# Patient Record
Sex: FEMALE | Race: BLACK OR AFRICAN AMERICAN | NOT HISPANIC OR LATINO | URBAN - METROPOLITAN AREA
[De-identification: names, ages, dates, MRNs, and addresses within clinical notes are randomized per-mention and may not be internally consistent; named-entity substitution may affect disease eponyms.]

---

## 2020-06-01 ENCOUNTER — OFFICE VISIT (OUTPATIENT)
Dept: URBAN - METROPOLITAN AREA CLINIC 98 | Facility: CLINIC | Age: 55
End: 2020-06-01
Payer: MEDICARE

## 2020-06-01 DIAGNOSIS — Z93.2 ILEOSTOMY PRESENT: ICD-10-CM

## 2020-06-01 DIAGNOSIS — K50.80 CROHN'S DISEASE OF BOTH SMALL AND LARGE INTESTINE WITHOUT COMPLICATION: ICD-10-CM

## 2020-06-01 PROCEDURE — G9711 PT HX TOT COL OR COLON CA: HCPCS | Performed by: INTERNAL MEDICINE

## 2020-06-01 PROCEDURE — 99214 OFFICE O/P EST MOD 30 MIN: CPT | Performed by: INTERNAL MEDICINE

## 2020-06-01 PROCEDURE — 99215 OFFICE O/P EST HI 40 MIN: CPT | Performed by: INTERNAL MEDICINE

## 2020-06-01 RX ORDER — FOLIC ACID 1 MG/1
TAKE 5 TABLETS BY ORAL ROUTE FOR 30 DAYS TABLET ORAL 1
Qty: 0 | Refills: 11 | Status: DISCONTINUED | COMMUNITY
Start: 1900-01-01

## 2020-06-01 RX ORDER — METHOTREXATE SODIUM 25 MG/ML
INJECT 1 MILLILITER (25 MG) BY SUBCUTANEOUS ROUTE ONCE WEEKLY INJECTION, SOLUTION INTRA-ARTERIAL; INTRAMUSCULAR; INTRAVENOUS
Qty: 1 | Refills: 0 | Status: DISCONTINUED | COMMUNITY
Start: 1900-01-01

## 2020-06-01 NOTE — PHYSICAL EXAM NECK/THYROID:
neck supple, full range of motion, thyroid normal , neck supple, full range of motion, thyroid normal

## 2020-06-01 NOTE — PHYSICAL EXAM CARDIOVASCULAR:
regular rate and rhythm, S1, S2 normal, no S3, S4, no murmurs , regular rate and rhythm, S1, S2 normal, no S3, S4, no murmurs

## 2020-06-01 NOTE — HPI-OTHER HISTORIES
55 yo female presents for consideration or J pouch or other appropriate surgery.  Has Crohns and last surgery was 2015.  1) Ahsan Cooney at [place] 2001- attempt at repair of Rectovag fistula- unsuccessful 2) Jin Navarrete - [place] - attempted repair of RV fistula - unsuccessful - no resection 3) Dr. Bernardo Wright ???? ---2009 was surgery at [facility] in [place] and knew she needed ileostomy and was done April 2009 and 1-2 week hosp.  4) 2011 -Proctectomy and appendectomy with Dr. Seo --  5) 2015 - Parastomal hernia?? Gabbi 6) 2015 - Cholecystectomy 7) 2015 - Small bowel resection- Gabbi  Has done well since Nov 2017. Has not been on any Crohn's disease medications for 5 years. Past CD meds included Remicade, Humira, 6MP, azathioprine, mtx.  Entyvio.  No Stelara  Syx: No abdominal pain. Right sided ileostomy  Empties it 10 times a day or  more. Does not awaken her from sleep. 2 piece bag and looks good.  Protruding.  No visible ulcers. 2015, had a fistula and repair by Dr. Seo. Due to the indentation, uses the barrier ring and molds it to avoid leakage. Has not seen an enterostomal therapist.  Disabled, no longer working - was a . Medicare and medicare disability.

## 2020-06-01 NOTE — PHYSICAL EXAM GASTROINTESTINAL
Signs of multiple past surgeries with midline scar.  RLQ ileostomy and bag, all seems functioning well. ??parastomal hernia, and no tendereness.  No fistula and no drainage.  Deformed area to right of navel.

## 2020-06-01 NOTE — PHYSICAL EXAM HENT:
extra ocular movements intact (EOMI), pupils equal, round, reactive to light and accommodation , extra ocular movements intact (EOMI), pupils equal, round, reactive to light and accommodation

## 2020-06-02 LAB
A/G RATIO: 1.7
ALBUMIN: 4.7
ALKALINE PHOSPHATASE: 124
ALT (SGPT): 50
AST (SGOT): 42
BASO (ABSOLUTE): 0
BASOS: 1
BILIRUBIN, TOTAL: <0.2
BUN/CREATININE RATIO: 10
BUN: 7
C-REACTIVE PROTEIN, QUANT: 2
CALCIUM: 9.6
CARBON DIOXIDE, TOTAL: 19
CHLORIDE: 103
CREATININE: 0.71
EGFR IF AFRICN AM: 112
EGFR IF NONAFRICN AM: 97
EOS (ABSOLUTE): 0.1
EOS: 1
GLOBULIN, TOTAL: 2.8
GLUCOSE: 89
HBSAG SCREEN: NEGATIVE
HEMATOCRIT: 41.9
HEMATOLOGY COMMENTS:: (no result)
HEMOGLOBIN: 13.6
IMMATURE CELLS: (no result)
IMMATURE GRANS (ABS): 0
IMMATURE GRANULOCYTES: 0
IRON BIND.CAP.(TIBC): 357
IRON SATURATION: 21
IRON: 74
LDH: 215
LYMPHS (ABSOLUTE): 2.5
LYMPHS: 44
MCH: 30.7
MCHC: 32.5
MCV: 95
MONOCYTES(ABSOLUTE): 0.5
MONOCYTES: 9
NEUTROPHILS (ABSOLUTE): 2.6
NEUTROPHILS: 45
NRBC: (no result)
PHOSPHORUS: 2.9
PLATELETS: 264
POTASSIUM: 4.4
PROTEIN, TOTAL: 7.5
RBC: 4.43
RDW: 13
SEDIMENTATION RATE-WESTERGREN: 23
SODIUM: 137
UIBC: 283
WBC: 5.8

## 2020-08-03 ENCOUNTER — OFFICE VISIT (OUTPATIENT)
Dept: URBAN - METROPOLITAN AREA TELEHEALTH 2 | Facility: TELEHEALTH | Age: 55
End: 2020-08-03
Payer: MEDICARE

## 2020-08-03 DIAGNOSIS — R74.8 ELEVATED LIVER ENZYMES: ICD-10-CM

## 2020-08-03 DIAGNOSIS — K50.80 CROHN'S DISEASE OF BOTH SMALL AND LARGE INTESTINE WITHOUT COMPLICATION: ICD-10-CM

## 2020-08-03 DIAGNOSIS — M25.50 JOINT PAIN: ICD-10-CM

## 2020-08-03 DIAGNOSIS — K43.5 PARASTOMAL HERNIA: ICD-10-CM

## 2020-08-03 PROCEDURE — 99214 OFFICE O/P EST MOD 30 MIN: CPT | Performed by: INTERNAL MEDICINE

## 2020-08-03 PROCEDURE — 74183 MRI ABD W/O CNTR FLWD CNTR: CPT | Performed by: INTERNAL MEDICINE

## 2020-08-03 PROCEDURE — 99215 OFFICE O/P EST HI 40 MIN: CPT | Performed by: INTERNAL MEDICINE

## 2020-08-03 PROCEDURE — 72197 MRI PELVIS W/O & W/DYE: CPT | Performed by: INTERNAL MEDICINE

## 2020-08-03 NOTE — HPI-TODAY'S VISIT:
8/3/20 53 yo female for 2 months telehealth and is doing well.  Labs were all nl.  No pain. No fistula. last fistula in 2015, by the navel and no recurences.  Dr. Seo-  She does not recall if the resection wa done at that time or may have been earlier. No surgery since Dr. Seo did the fistula repair. Will obtain records for review. Empties ostomy 10 times  a day or more. Liquid.  She does not have plans--- connection--- to have surgery. Dr. Cristian Delacruz is who she wants to consult.  She is thinking that she has pain from what she thinks is possible recurrence of parastomal hernia and to her,THAT IS JUSTIFICATION TO CHANGE AND CLOSE THE OSTOMY AND HAVE THE J POUCH BACK, BUT i HAVE TOLD HER THAT THERE IS A HIGHER CHANCE OF RECURRENCE OF HER CROHN'S DISEASE  =====6/1/20====  53 yo female presents for consideration or J pouch or other appropriate surgery.  Has Crohns and last surgery was 2015.  1) Ahsan Cooney at [place] 2001- attempt at repair of Rectovag fistula- unsuccessful 2) Jin Navarrete [facility] - attempted repair of RV fistula - unsuccessful - no resection 3) Dr. Bernardo Wright ???? ---2009 was surgery at [facility] in [place] and knew she needed ileostomy and was done April 2009 and 1-2 week hosp.  4) 2011 -Proctectomy and appendectomy with Dr. Seo --  5) 2015 - Parastomal hernia?? Gabbi 6) 2015 - Cholecystectomy 7) 2015 - Small bowel resection- Gabbi  Has done well since Nov 2017. Has not been on any Crohn's disease medications for 5 years. Past CD meds included Remicade, Humira, 6MP, azathioprine, mtx.  Entyvio.  No Stelara  Syx: No abdominal pain. Right sided ileostomy  Empties it 10 times a day or  more. Does not awaken her from sleep. 2 piece bag and looks good.  Protruding.  No visible ulcers. 2015, had a fistula and repair by Dr. Seo. Due to the indentation, uses the barrier ring and molds it to avoid leakage. Has not seen an enterostomal therapist.  Disabled, no longer working - was a . Medicare and medicare disability.

## 2021-08-28 ENCOUNTER — TELEPHONE ENCOUNTER (OUTPATIENT)
Dept: URBAN - METROPOLITAN AREA CLINIC 13 | Facility: CLINIC | Age: 56
End: 2021-08-28

## 2021-08-29 ENCOUNTER — TELEPHONE ENCOUNTER (OUTPATIENT)
Dept: URBAN - METROPOLITAN AREA CLINIC 13 | Facility: CLINIC | Age: 56
End: 2021-08-29

## 2022-02-22 ENCOUNTER — OFFICE VISIT (OUTPATIENT)
Dept: URBAN - METROPOLITAN AREA CLINIC 98 | Facility: CLINIC | Age: 57
End: 2022-02-22
Payer: MEDICARE

## 2022-02-22 ENCOUNTER — WEB ENCOUNTER (OUTPATIENT)
Dept: URBAN - METROPOLITAN AREA CLINIC 98 | Facility: CLINIC | Age: 57
End: 2022-02-22

## 2022-02-22 DIAGNOSIS — K50.90 CROHN'S DISEASE: ICD-10-CM

## 2022-02-22 DIAGNOSIS — R74.8 ELEVATED LIVER ENZYMES: ICD-10-CM

## 2022-02-22 DIAGNOSIS — M25.50 JOINT PAIN: ICD-10-CM

## 2022-02-22 DIAGNOSIS — Z93.2 ILEOSTOMY IN PLACE: ICD-10-CM

## 2022-02-22 PROCEDURE — 99215 OFFICE O/P EST HI 40 MIN: CPT | Performed by: INTERNAL MEDICINE

## 2022-02-22 NOTE — HPI-TODAY'S VISIT:
57 yo female comes in for 1.5 year f/u.  s/p proctocolectomy and ileostomy for Crohn's disease 2009.  No medications at this time. Crohn's disease is in remission since 2017. Feels well. No abdominal pain.  Abdominal pain is gone.  Joint aches are gone. Sleeping very well. Appetite good and weight stable.  Crohn's disease is in clinical remission at this time. I will advise blood work and no additional testing at this time.  Her clinical condition is excellent news.  Work-wise, she is considering employment options-- taking an online course to be an : health and life.  Interested and able potentially to return to the work force.  No other doctor visits in last year except ObGyn last  year that was normal. Normal mammogram.  Exam-- abdomena benign Functioning stoma. Otherwise wnl.   Last hospitalization was December 2015, more than 6 years ago. \============================= 8/3/20  8/3/20 53 yo female for 2 months telehealth and is doing well.  Labs were all nl.  No pain. No fistula. last fistula in 2015, by the navel and no recurences.  Dr. Seo-  She does not recall if the resection wa done at that time or may have been earlier. No surgery since Dr. Seo did the fistula repair. Will obtain records for review. Empties ostomy 10 times  a day or more. Liquid.  She does not have plans--- connection--- to have surgery. Dr. Cristian Delacruz is who she wants to consult.  She is thinking that she has pain from what she thinks is possible recurrence of parastomal hernia and to her,THAT IS JUSTIFICATION TO CHANGE AND CLOSE THE OSTOMY AND HAVE THE J POUCH BACK, BUT i HAVE TOLD HER THAT THERE IS A HIGHER CHANCE OF RECURRENCE OF HER CROHN'S DISEASE  =====6/1/20====  53 yo female presents for consideration or J pouch or other appropriate surgery.  Has Crohns and last surgery was 2015.  1) Ahsan Cooney at [place] 2001- attempt at repair of Rectovag fistula- unsuccessful 2) Jin Noriega [place] - attempted repair of RV fistula - unsuccessful - no resection 3) Dr. Bernardo Wright ???? ---2009 was surgery at [facility] in [place] and knew she needed ileostomy and was done April 2009 and 1-2 week hosp.  4) 2011 -Proctectomy and appendectomy with Dr. Seo --  5) 2015 - Parastomal hernia?? Gabbi 6) 2015 - Cholecystectomy 7) 2015 - Small bowel resection- Gabbi  Has done well since Nov 2017. Has not been on any Crohn's disease medications for 5 years. Past CD meds included Remicade, Humira, 6MP, azathioprine, mtx.  Entyvio.  No Stelara  Syx: No abdominal pain. Right sided ileostomy  Empties it 10 times a day or  more. Does not awaken her from sleep. 2 piece bag and looks good.  Protruding.  No visible ulcers. 2015, had a fistula and repair by Dr. Seo. Due to the indentation, uses the barrier ring and molds it to avoid leakage. Has not seen an enterostomal therapist.  Disabled, no longer working - was a . Medicare and medicare disability.

## 2022-02-23 LAB
A/G RATIO: 1.8
ALBUMIN: 4.9
ALKALINE PHOSPHATASE: 136
ALT (SGPT): 37
AST (SGOT): 27
BASO (ABSOLUTE): 0
BASOS: 0
BILIRUBIN, TOTAL: 0.2
BUN/CREATININE RATIO: 23
BUN: 15
C-REACTIVE PROTEIN, QUANT: 2
CALCIUM: 10.4
CARBON DIOXIDE, TOTAL: 16
CHLORIDE: 102
CREATININE: 0.65
EGFR IF AFRICN AM: 115
EGFR IF NONAFRICN AM: 100
EOS (ABSOLUTE): 0.1
EOS: 1
FERRITIN, SERUM: 111
FOLATE (FOLIC ACID), SERUM: >20
GLOBULIN, TOTAL: 2.7
GLUCOSE: 89
HEMATOCRIT: 40.2
HEMATOLOGY COMMENTS:: (no result)
HEMOGLOBIN: 13.6
IMMATURE CELLS: (no result)
IMMATURE GRANS (ABS): 0
IMMATURE GRANULOCYTES: 0
IRON BIND.CAP.(TIBC): 396
IRON SATURATION: 28
IRON: 111
LYMPHS (ABSOLUTE): 3.2
LYMPHS: 46
MCH: 31.4
MCHC: 33.8
MCV: 93
MONOCYTES(ABSOLUTE): 0.6
MONOCYTES: 8
NEUTROPHILS (ABSOLUTE): 3.1
NEUTROPHILS: 45
NRBC: (no result)
PLATELETS: 254
POTASSIUM: 4.2
PROTEIN, TOTAL: 7.6
RBC: 4.33
RDW: 13.6
SEDIMENTATION RATE-WESTERGREN: 51
SODIUM: 140
UIBC: 285
VITAMIN B12: 878
VITAMIN D, 25-HYDROXY: 45.6
WBC: 7

## 2022-04-30 ENCOUNTER — TELEPHONE ENCOUNTER (OUTPATIENT)
Dept: URBAN - METROPOLITAN AREA CLINIC 121 | Facility: CLINIC | Age: 57
End: 2022-04-30

## 2022-05-01 ENCOUNTER — TELEPHONE ENCOUNTER (OUTPATIENT)
Dept: URBAN - METROPOLITAN AREA CLINIC 121 | Facility: CLINIC | Age: 57
End: 2022-05-01

## 2023-02-22 ENCOUNTER — DASHBOARD ENCOUNTERS (OUTPATIENT)
Age: 58
End: 2023-02-22

## 2023-02-22 ENCOUNTER — OFFICE VISIT (OUTPATIENT)
Dept: URBAN - METROPOLITAN AREA CLINIC 98 | Facility: CLINIC | Age: 58
End: 2023-02-22
Payer: MEDICARE

## 2023-02-22 ENCOUNTER — TELEPHONE ENCOUNTER (OUTPATIENT)
Dept: URBAN - METROPOLITAN AREA CLINIC 98 | Facility: CLINIC | Age: 58
End: 2023-02-22

## 2023-02-22 VITALS
HEART RATE: 102 BPM | BODY MASS INDEX: 33.8 KG/M2 | WEIGHT: 198 LBS | DIASTOLIC BLOOD PRESSURE: 81 MMHG | SYSTOLIC BLOOD PRESSURE: 121 MMHG | HEIGHT: 64 IN | TEMPERATURE: 97.5 F

## 2023-02-22 DIAGNOSIS — Z93.2 ILEOSTOMY IN PLACE: ICD-10-CM

## 2023-02-22 DIAGNOSIS — R10.13 EPIGASTRIC ABDOMINAL PAIN: ICD-10-CM

## 2023-02-22 DIAGNOSIS — R74.8 ELEVATED LIVER ENZYMES: ICD-10-CM

## 2023-02-22 DIAGNOSIS — K50.90 CROHN'S DISEASE: ICD-10-CM

## 2023-02-22 DIAGNOSIS — M72.2 PLANTAR FASCIITIS: ICD-10-CM

## 2023-02-22 DIAGNOSIS — M25.50 JOINT PAIN: ICD-10-CM

## 2023-02-22 PROCEDURE — 99215 OFFICE O/P EST HI 40 MIN: CPT | Performed by: INTERNAL MEDICINE

## 2023-02-22 RX ORDER — CELECOXIB 100 MG/1
1 CAPSULE WITH FOOD CAPSULE ORAL ONCE A DAY
Status: ACTIVE | COMMUNITY
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
## 2023-02-23 LAB
A/G RATIO: 1.6
ALBUMIN: 4.6
ALKALINE PHOSPHATASE: 145
ALT (SGPT): 47
AMYLASE: 99
AST (SGOT): 39
BASO (ABSOLUTE): 0
BASOS: 0
BILIRUBIN, TOTAL: <0.2
BUN/CREATININE RATIO: 11
BUN: 7
C-REACTIVE PROTEIN, QUANT: 1
CALCIUM: 9.8
CARBON DIOXIDE, TOTAL: 20
CHLORIDE: 104
CREATININE: 0.66
EGFR: 102
EOS (ABSOLUTE): 0.1
EOS: 3
FERRITIN, SERUM: 104
FOLATE (FOLIC ACID), SERUM: >20
GLOBULIN, TOTAL: 2.8
GLUCOSE: 83
HEMATOCRIT: 41.6
HEMATOLOGY COMMENTS:: (no result)
HEMOGLOBIN: 13.6
IMMATURE CELLS: (no result)
IMMATURE GRANS (ABS): 0
IMMATURE GRANULOCYTES: 0
IRON BIND.CAP.(TIBC): 368
IRON SATURATION: 21
IRON: 77
LIPASE: 45
LYMPHS (ABSOLUTE): 2.7
LYMPHS: 53
MCH: 30.6
MCHC: 32.7
MCV: 94
MONOCYTES(ABSOLUTE): 0.4
MONOCYTES: 8
NEUTROPHILS (ABSOLUTE): 1.8
NEUTROPHILS: 36
NRBC: (no result)
PLATELETS: 178
POTASSIUM: 4.4
PROTEIN, TOTAL: 7.4
RBC: 4.45
RDW: 13.3
SEDIMENTATION RATE-WESTERGREN: 21
SODIUM: 140
UIBC: 291
VITAMIN B12: 869
VITAMIN D, 25-HYDROXY: 33.5
WBC: 5.1

## 2023-02-24 ENCOUNTER — TELEPHONE ENCOUNTER (OUTPATIENT)
Dept: URBAN - METROPOLITAN AREA CLINIC 92 | Facility: CLINIC | Age: 58
End: 2023-02-24

## 2023-02-24 PROBLEM — 707724006 ELEVATED LIVER ENZYMES LEVEL: Status: ACTIVE | Noted: 2020-08-03

## 2023-02-27 ENCOUNTER — OFFICE VISIT (OUTPATIENT)
Dept: URBAN - METROPOLITAN AREA SURGERY CENTER 18 | Facility: SURGERY CENTER | Age: 58
End: 2023-02-27

## 2023-02-27 RX ORDER — CELECOXIB 100 MG/1
1 CAPSULE WITH FOOD CAPSULE ORAL ONCE A DAY
Status: ACTIVE | COMMUNITY

## 2023-02-28 PROBLEM — 79922009: Status: ACTIVE | Noted: 2023-02-28

## 2023-02-28 PROBLEM — 698065002 ACID REFLUX: Status: ACTIVE | Noted: 2023-02-23

## 2023-02-28 PROBLEM — 302111002 ILEOSTOMY PRESENT: Status: ACTIVE | Noted: 2020-08-03

## 2023-02-28 LAB
ALBUMIN: 5.4
ALKALINE PHOSPHATASE: 182
ALT (SGPT): 49
AST (SGOT): 32
BILIRUBIN, DIRECT: <0.1
BILIRUBIN, TOTAL: 0.4
CALPROTECTIN, FECAL: <16
HBSAG SCREEN: NEGATIVE
HEP B CORE AB, IGM: NEGATIVE
HEP B CORE AB, TOT: NEGATIVE
HEP B SURFACE AB, QUAL: NON REACTIVE
HEP C VIRUS AB: NON REACTIVE
LACTOFERRIN, FECAL, QUANT.: <0.42
PROTEIN, TOTAL: 8.9

## 2023-03-21 ENCOUNTER — CLAIMS CREATED FROM THE CLAIM WINDOW (OUTPATIENT)
Dept: URBAN - METROPOLITAN AREA SURGERY CENTER 18 | Facility: SURGERY CENTER | Age: 58
End: 2023-03-21

## 2023-03-21 ENCOUNTER — CLAIMS CREATED FROM THE CLAIM WINDOW (OUTPATIENT)
Dept: URBAN - METROPOLITAN AREA CLINIC 4 | Facility: CLINIC | Age: 58
End: 2023-03-21
Payer: MEDICARE

## 2023-03-21 ENCOUNTER — CLAIMS CREATED FROM THE CLAIM WINDOW (OUTPATIENT)
Dept: URBAN - METROPOLITAN AREA SURGERY CENTER 18 | Facility: SURGERY CENTER | Age: 58
End: 2023-03-21
Payer: MEDICARE

## 2023-03-21 ENCOUNTER — TELEPHONE ENCOUNTER (OUTPATIENT)
Dept: URBAN - METROPOLITAN AREA CLINIC 98 | Facility: CLINIC | Age: 58
End: 2023-03-21

## 2023-03-21 DIAGNOSIS — K21.00 ALKALINE REFLUX ESOPHAGITIS: ICD-10-CM

## 2023-03-21 DIAGNOSIS — K29.70 GASTRITIS, UNSPECIFIED, WITHOUT BLEEDING: ICD-10-CM

## 2023-03-21 DIAGNOSIS — K31.89 ACQUIRED DEFORMITY OF DUODENUM: ICD-10-CM

## 2023-03-21 DIAGNOSIS — K31.89 OTHER DISEASES OF STOMACH AND DUODENUM: ICD-10-CM

## 2023-03-21 PROCEDURE — 88312 SPECIAL STAINS GROUP 1: CPT | Performed by: PATHOLOGY

## 2023-03-21 PROCEDURE — 43239 EGD BIOPSY SINGLE/MULTIPLE: CPT | Performed by: INTERNAL MEDICINE

## 2023-03-21 PROCEDURE — 88305 TISSUE EXAM BY PATHOLOGIST: CPT | Performed by: PATHOLOGY

## 2023-03-21 PROCEDURE — G8907 PT DOC NO EVENTS ON DISCHARG: HCPCS | Performed by: INTERNAL MEDICINE

## 2023-03-21 RX ORDER — CELECOXIB 100 MG/1
1 CAPSULE WITH FOOD CAPSULE ORAL ONCE A DAY
Status: ACTIVE | COMMUNITY

## 2023-03-21 RX ORDER — OMEPRAZOLE 40 MG/1
1 CAPSULE 30 MINUTES BEFORE MORNING MEAL CAPSULE, DELAYED RELEASE ORAL ONCE A DAY
Qty: 90 CAPSULES | Refills: 3 | OUTPATIENT
Start: 2023-03-21

## 2023-04-11 ENCOUNTER — TELEPHONE ENCOUNTER (OUTPATIENT)
Dept: URBAN - METROPOLITAN AREA CLINIC 98 | Facility: CLINIC | Age: 58
End: 2023-04-11

## 2023-05-24 ENCOUNTER — OFFICE VISIT (OUTPATIENT)
Dept: URBAN - METROPOLITAN AREA CLINIC 98 | Facility: CLINIC | Age: 58
End: 2023-05-24